# Patient Record
(demographics unavailable — no encounter records)

---

## 2024-11-07 NOTE — PHYSICAL EXAM
[General Appearance - Alert] : alert [General Appearance - In No Acute Distress] : in no acute distress [General Appearance - Well Nourished] : well nourished [General Appearance - Well-Appearing] : healthy appearing [Sclera] : the sclera and conjunctiva were normal [Neck Appearance] : the appearance of the neck was normal [] : no respiratory distress [Chest Palpation] : palpation of the chest revealed no abnormalities [Abdomen Soft] : soft [Abdomen Tenderness] : non-tender [No Focal Deficits] : no focal deficits [Affect] : the affect was normal

## 2024-11-08 NOTE — ASSESSMENT
[FreeTextEntry1] : 40 yo M with PMHx HLD, hypothyroidism and PSHx of anorectal fistula ligation in 2005 in [facility] presents for abnormal imaging finding follow up. While inpatient in 1/2024 pt underwent CTAP with liver with 1.2cm hypodensity concerning for hemangioma.   #Incidental Liver Lesion <2cm, concern for hemangioma on CTAP 1/2024 #Class I Obesity #Ex-smoker, quit 2022 #Hypothyroid on synthroid  Recommendations: -obtain MR Abdomen w/wo IVC Liver Protocol for liver lesion surveillance -obtain CBC, CMP, A1c, TSH, AFP, Hep A/B/C serology -calculate FIB-4 pending above serologies -offered nutrition referral, but patient declined -patient follows with primary medical provider outside of [facility], reports he will organize follow up -discussed lifestyle modifications including continued smoking cessation, diet, and exercise -RTC 3 mo (liver clinic)  Mg Bajwa MD PGY-6 GI/Hepatology Fellow  Case discussed with Attending

## 2024-11-08 NOTE — END OF VISIT
[] : Fellow [FreeTextEntry3] : 41 yo [place] [place] w/ Hx of smoking, Hx of anal surgery (likely pilonidal cyst excision), drainage from umbilicus due to suspected urachal cyst (seen by surgery, Dr. 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  CT a/p w/ iv 1/10/24 showed a small 1.2 cm R hepatic lobe lesion that was incompletely characterized, but given nodular discontinuous peripheral enhancement suspected to be hemangioma.    Will get dedicated imaging, MRI abd w/wo contrast for better characterization, tumor markers, hepatitis viral serologies, CBC, BMP, hepatic function panel, coags. Patient is also obese, BMI 33, will check metabolic risk factors (HbA1c, lipid panel), TSH, and assess for liver fibrosis w/ Fibroscan (after fib4 calculation).  Nutritionist ref. offered but patient declined for now.  Advised on diet, exercise, weight loss and discussed complications of obesity,

## 2024-11-08 NOTE — ASSESSMENT
[FreeTextEntry1] : 40 yo M with PMHx HLD, hypothyroidism and PSHx of anorectal fistula ligation in 2005 in [facility] presents for abnormal imaging finding follow up. While inpatient in 1/2024 pt underwent CTAP with liver with 1.2cm hypodensity concerning for hemangioma.   #Incidental Liver Lesion <2cm, concern for hemangioma on CTAP 1/2024 #Class I Obesity #Ex-smoker, quit 2022 #Hypothyroid on synthroid  Recommendations: -obtain MR Abdomen w/wo IVC Liver Protocol for liver lesion surveillance -obtain CBC, CMP, A1c, TSH, AFP, Hep A/B/C serology -calculate FIB-4 pending above serologies -offered nutrition referral, but patient declined -patient follows with primary medical provider outside of [facility], reports he will organize follow up -discussed lifestyle modifications including continued smoking cessation, diet, and exercise -RTC 3 mo (liver clinic)  Mg Bajwa MD PGY-6 GI/Hepatology Fellow  Case discussed with Attending

## 2024-11-08 NOTE — HISTORY OF PRESENT ILLNESS
[___] : Performed [unfilled] [de-identified] : FINDINGS: LOWER CHEST: Within normal limits.  LIVER: Homogenous well-circumscribed right hepatic lobe hypodensity 1.2  cm, Hounsfield units 68 (2:42). Hypodense lesion in segment 8 with  suggestion of nodular discontinuous peripheral enhancement favoring  hemangioma. BILE DUCTS: Normal caliber. GALLBLADDER: Within normal limits. SPLEEN: Few punctate calcifications, likely sequela of prior  granulomatous disease. PANCREAS: Within normal limits. ADRENALS: Within normal limits. KIDNEYS/URETERS: Within normal limits.  BLADDER: Thin band-like structure between superior anterior bladder and  umbilicus. No dilated fluid filled component. No bladder diverticulum. REPRODUCTIVE ORGANS: Prostatewithin normal limits.  BOWEL: No bowel obstruction. Appendix is normal. PERITONEUM: No ascites. VESSELS: Within normal limits. RETROPERITONEUM/LYMPH NODES: No lymphadenopathy. ABDOMINAL WALL: Within normal limits. BONES: No aggressive bony lesions. Multiple small scattered sclerotic  lesions in the bony pelvis, most likely benign bone islands.  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 No urachal cyst or dilated fluid filled component. Incidental small right hepatic lobe lesion, incompletely characterized.  Might consider contrast-enhanced liver MRI for dedicated evaluation based  on clinical considerations  [FreeTextEntry1] : 40 yo M with PMHx HLD, hypothyroidism and PSHx of anorectal fistula ligation in 2005 in [facility] presents for abnormal imaging finding follow up. While inpatient in 1/2024 pt underwent CTAP with liver with 1.2cm hypodensity concerning for hemangioma. He is here now for f/u imaging for surveillance of the lesion. Today on evaluation patient denies n/v/f/c/ap/cp/dysphagia/odynophagia/heartburn/regurgitation. BM habit 1x/day. No blood or black appearance. Pt not straining. No EtOH use. Ex-smoker, started when he was young, but reports incr smoking 2014 - 2021 (3-4 cig/day), and then quit completely 2022. No illicit substance use. Patient taking synthroid. Denies supplements, teas, powders. No abdominal surgery hx. Denies allergies. Denies FHx GI/liver malignancy. Never had EGD or colonoscopy before. Patient reports he works as  (works overnight), sometimes has to hold bladder for extended periods of time. He is single. Patient has discussed weight loss with his PCP, but has not been successful in implementing diet/exercise. Patient denies personal hx of any viral hepatitis.

## 2024-11-08 NOTE — HISTORY OF PRESENT ILLNESS
[___] : Performed [unfilled] [de-identified] : FINDINGS: LOWER CHEST: Within normal limits.  LIVER: Homogenous well-circumscribed right hepatic lobe hypodensity 1.2  cm, Hounsfield units 68 (2:42). Hypodense lesion in segment 8 with  suggestion of nodular discontinuous peripheral enhancement favoring  hemangioma. BILE DUCTS: Normal caliber. GALLBLADDER: Within normal limits. SPLEEN: Few punctate calcifications, likely sequela of prior  granulomatous disease. PANCREAS: Within normal limits. ADRENALS: Within normal limits. KIDNEYS/URETERS: Within normal limits.  BLADDER: Thin band-like structure between superior anterior bladder and  umbilicus. No dilated fluid filled component. No bladder diverticulum. REPRODUCTIVE ORGANS: Prostatewithin normal limits.  BOWEL: No bowel obstruction. Appendix is normal. PERITONEUM: No ascites. VESSELS: Within normal limits. RETROPERITONEUM/LYMPH NODES: No lymphadenopathy. ABDOMINAL WALL: Within normal limits. BONES: No aggressive bony lesions. Multiple small scattered sclerotic  lesions in the bony pelvis, most likely benign bone islands.  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 No urachal cyst or dilated fluid filled component. Incidental small right hepatic lobe lesion, incompletely characterized.  Might consider contrast-enhanced liver MRI for dedicated evaluation based  on clinical considerations  [FreeTextEntry1] : 40 yo M with PMHx HLD, hypothyroidism and PSHx of anorectal fistula ligation in 2005 in [facility] presents for abnormal imaging finding follow up. While inpatient in 1/2024 pt underwent CTAP with liver with 1.2cm hypodensity concerning for hemangioma. He is here now for f/u imaging for surveillance of the lesion. Today on evaluation patient denies n/v/f/c/ap/cp/dysphagia/odynophagia/heartburn/regurgitation. BM habit 1x/day. No blood or black appearance. Pt not straining. No EtOH use. Ex-smoker, started when he was young, but reports incr smoking 2014 - 2021 (3-4 cig/day), and then quit completely 2022. No illicit substance use. Patient taking synthroid. Denies supplements, teas, powders. No abdominal surgery hx. Denies allergies. Denies FHx GI/liver malignancy. Never had EGD or colonoscopy before. Patient reports he works as  (works overnight), sometimes has to hold bladder for extended periods of time. He is single. Patient has discussed weight loss with his PCP, but has not been successful in implementing diet/exercise. Patient denies personal hx of any viral hepatitis.

## 2024-11-08 NOTE — END OF VISIT
[] : Fellow [FreeTextEntry3] : 39 [facility] [place]/ Hx of [place], Hx of anal surgery (likely pilonidal cyst excision), drainage from umbilicus due to suspected urachal cyst (seen by surgery, Dr. 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  CT a/p w/ iv 1/10/24 showed a small 1.2 cm R hepatic lobe lesion that was incompletely characterized, but given nodular discontinuous peripheral enhancement suspected to be hemangioma.    Will get dedicated imaging, MRI abd w/wo contrast for better characterization, tumor markers, hepatitis viral serologies, CBC, BMP, hepatic function panel, coags. Patient is also obese, BMI 33, will check metabolic risk factors (HbA1c, lipid panel), TSH, and assess for liver fibrosis w/ Fibroscan (after fib4 calculation).  Nutritionist ref. offered but patient declined for now.  Advised on diet, exercise, weight loss and discussed complications of obesity,